# Patient Record
Sex: FEMALE | Race: BLACK OR AFRICAN AMERICAN
[De-identification: names, ages, dates, MRNs, and addresses within clinical notes are randomized per-mention and may not be internally consistent; named-entity substitution may affect disease eponyms.]

---

## 2017-04-18 ENCOUNTER — HOSPITAL ENCOUNTER (EMERGENCY)
Dept: HOSPITAL 74 - JER | Age: 52
Discharge: HOME | End: 2017-04-18
Payer: COMMERCIAL

## 2017-04-18 VITALS — BODY MASS INDEX: 36 KG/M2

## 2017-04-18 VITALS — HEART RATE: 87 BPM | DIASTOLIC BLOOD PRESSURE: 74 MMHG | SYSTOLIC BLOOD PRESSURE: 139 MMHG

## 2017-04-18 VITALS — TEMPERATURE: 98 F

## 2017-04-18 DIAGNOSIS — I10: ICD-10-CM

## 2017-04-18 DIAGNOSIS — T78.40XA: Primary | ICD-10-CM

## 2017-04-18 NOTE — PDOC
History of Present Illness





- General


Chief Complaint: Allergic Reaction


Stated Complaint: ALLERGIC REACTION


Time Seen by Provider: 04/18/17 20:27





- History of Present Illness


Initial Comments: 


04/18/17 20:41


CHIEF COMPLAINT: possible allergic reaction





HISTORY OF PRESENT ILLNESS: 51 yo F with hx of GERD, lupus, HTN presents to ED 

with possible allergic reaction.  Patient states she saw a dermatologist 2 

weeks ago and was prescribed clindamycin and minocycline for "some bumps that 

were like acne" on her face, and yesterday she started feeling "some bumps on 

my tongue, and now on my lips."  She denies any shortness of breath, swelling 

to tongue, throat, or mouth, and denies any itching or rash on her body.  She


denies any fever, chills. vomiting, diarrhea. 





No recent travel or sick contacts. 





PAST MEDICAL HISTORY: Denies past medical history





FAMILY HISTORY: Denies





SOCIAL HISTORY: Former smoker, quit "decades ago."  





SURGICAL HISTORY: Denies





ALLERGIES: No known drug allergies





REVIEW OF SYSTEMS


General/Constitutional: Denies fever or chills. Denies weakness, weight change.





HEENT: "I have these tiny little bumps on the inside of my lips." Denies change 

in vision. Denies ear pain or discharge. Denies sore throat.





Cardiovascular: Denies chest pain or shortness of breath.





Respiratory: Denies cough, wheezing, or hemoptysis.





Gastrointestinal: Denies nausea, vomiting, diarrhea or constipation. Denies 

rectal bleeding.





Genitourinary: Denies dysuria, frequency, or change in urination.





Musculoskeletal: Denies joint or muscle swelling or pain. Denies neck or back 

pain.





Skin and breasts: Denies rash or easy bruising.








PHYSICAL EXAM


General Appearance: Well-appearing, appropriately dressed.  No apparent distress

, no intoxication.





HEENT: No lesions to lips or tongue or anywhere on mucus membranes. No swelling 

to lips, mouth, tongue, uvula.  No erythema to orpharynx. EOMI, PERRLA, normal 

ENT inspection, normal voice, TMs normal, pharynx normal.  No conjunctival 

pallor.  No photophobia, scleral icterus.





Neck: Supple.  Trachea midline. No tenderness, rigidity, carotid bruit, stridor

, lymphadenopathy, or thyromegaly. 





Respiratory/Chest: Lungs CTAB.  





Cardiovascular: RRR. S1, S2. 





Musculoskeletal/Extremities:  Normal inspection. FROM of all extremities, 

normal capillary refill.  Pelvis Stable.  No CVA tenderness. No tenderness to 

extremities, pedal edema, swelling, erythema or deformity.





Integumentary: Appropriate color, dry, warm.  No cyanosis, erythema, jaundice 

or rash





Neurologic: CNs II-XII intact. Fully oriented, alert.  Appropriate mood/affect. 

Motor strength 5/5.  No appreciable EOM palsy, facial droop or sensory deficit.





04/18/17 21:00








Past History





- Past Medical History


Allergies/Adverse Reactions: 


 Allergies











Allergy/AdvReac Type Severity Reaction Status Date / Time


 


amoxicillin [Amoxicillin] Allergy   Verified 04/18/17 20:15


 


amoxicillin trihydrate Allergy   Verified 04/18/17 20:15





[From Omeclamox-Gus]     


 


clarithromycin Allergy   Verified 04/18/17 20:15





[From Omeclamox-Gus]     


 


codeine [Codeine] Allergy  Vomiting Verified 04/18/17 20:15


 


omeprazole Allergy   Verified 04/18/17 20:15





[From Omeclamox-Gus]     


 


omeclamox-gus Allergy  sob, Uncoded 04/18/17 20:15





   nasuea,Vomiting  











Home Medications: 


Ambulatory Orders





Aspirin [ASA -] 81 mg PO DAILY 03/17/13 


Losartan Potassium 25 mg PO DAILY 03/17/13 


Cyclobenzaprine HCl [Flexeril -] 5 mg PO TID PRN #12 tablet 12/03/16 


Nifedipine [Nifedipine ER] 90 mg PO DAILY 12/03/16 


Diphenhydramine HCl [Benadryl -] 25 mg PO Q6H #28 capsule 04/18/17 








Cardiac Disorders: Yes (palpitations)


HTN: Yes





- Psycho/Social/Smoking Cessation Hx


Anxiety: No


Suicidal Ideation: No


Smoking Status: No


Smoking History: Never smoked


Number of Cigarettes Smoked Daily: 0


Hx Alcohol Use: No


Drug/Substance Use Hx: No


Substance Use Type: None


Hx Substance Use Treatment: No





*Physical Exam





- Vital Signs


 Last Vital Signs











Temp Pulse Resp BP Pulse Ox


 


 98.0 F   101 H  20   152/84   99 


 


 04/18/17 20:15  04/18/17 20:15  04/18/17 20:15  04/18/17 20:15  04/18/17 20:15














Medical Decision Making





- Medical Decision Making





04/18/17 21:03


51 yo F with hx of GERD, HTN, lupus presents to ED with possible allergic 

reaction. 





Exam unremarkable, no lesions seen to mouth or mucus membranes. 





-25 mg Benadryl 





Advised patient to discontinue medications and follow up with dermatologist for 

further evaluation of perceived acne/bumps on face.  Advised patient of signs 

and symptoms for return to ER; patient verbalized understanding and agrees to 

plan. 





*DC/Admit/Observation/Transfer


Diagnosis at time of Disposition: 


Allergic reaction


Qualifiers:


 Encounter type: initial encounter Qualified Code(s): T78.40XA - Allergy, 

unspecified, initial encounter





- Discharge Dispostion


Disposition: HOME


Condition at time of disposition: Stable


Admit: No





- Prescriptions


Prescriptions: 


Diphenhydramine HCl [Benadryl -] 25 mg PO Q6H #28 capsule





- Referrals


Referrals: 


Abram Armstrong MD [Primary Care Provider] - 





- Patient Instructions


Additional Instructions: 


Please discontinue the clindamycin and minocyline and follow up with your 

dermatologist for further evaluation and other treatment options.  If you 

experience shortness of breath, swelling to your lips, mouth, tongue, throat, 

or develop any difficulty breathing, or develop a fever or rash to the rest of 

your body, please return to the ER.

## 2018-01-14 ENCOUNTER — HOSPITAL ENCOUNTER (EMERGENCY)
Dept: HOSPITAL 74 - JER | Age: 53
Discharge: HOME | End: 2018-01-14
Payer: COMMERCIAL

## 2018-01-14 VITALS — HEART RATE: 89 BPM | DIASTOLIC BLOOD PRESSURE: 81 MMHG | TEMPERATURE: 98.2 F | SYSTOLIC BLOOD PRESSURE: 153 MMHG

## 2018-01-14 VITALS — BODY MASS INDEX: 35.2 KG/M2

## 2018-01-14 DIAGNOSIS — R20.8: Primary | ICD-10-CM
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
## 2018-01-14 DIAGNOSIS — T49.8X5A: ICD-10-CM

## 2018-01-14 NOTE — PDOC
History of Present Illness





- General


Chief Complaint: Pain


Stated Complaint: RASH


Time Seen by Provider: 01/14/18 08:10


History Source: Patient


Exam Limitations: No Limitations





- History of Present Illness


Initial Comments: 





01/14/18 08:25


Patient is a 52-year-old female with history of lupus and hypertension presents 

for evaluation of "burning to her skin". Patient was a physical per. May 

applied a Biofreeze type ointment to her skin since with burning. Patient was 

nervous to wash area with soap so has only been applying water and Vaseline.  

Still with burning sensation. There is no erythema, no edema, no rash. Me 

"burning sensation"





Allergies: No known allergies





Medications: See medication list[]





Family History: Non-contributory





Social History: Denies smoking, alcohol use, or IVDU





Vital signs on arrival are [notable for pulse of 96.]





Review of Systems


GENERAL/CONSTITUTIONAL: [No fever or chills. No weakness. No weight change.]


HEAD, EYES, EARS, NOSE AND THROAT: [No change in vision. No ear pain or 

discharge. No sore throat. ]


CARDIOVASCULAR: [No chest pain or shortness of breath.]


RESPIRATORY: [No cough, wheezing, or hemoptysis.]


GASTROINTESTINAL: [No nausea, vomiting, diarrhea or constipation. No rectal 

bleeding.]


GENITOURINARY: [No dysuria, frequency, or change in urination.]


MUSCULOSKELETAL: [No joint or muscle swelling or pain. No neck or back pain.]


SKIN: [No rash or easy bruising. Burning sensation to the skin. ]


NEUROLOGIC: [No headache, vertigo, loss of consciousness, or loss of sensation.]


PSYCHIATRIC: [No depression or anxiety.]


ENDOCRINE: [No increased thirst. No abnormal weight change.]


HEMATOLOGIC/LYMPHATIC: [No anemia, easy bleeding, or history of blood clots.]


ALLERGIC/IMMUNOLOGIC: [No hives or skin allergy. No latex allergy.]





Physical Exam: 


GENERAL: [The patient is awake, alert, and fully oriented, in no acute distress.

]


EYES: [Pupils equal, round and reactive to light, extraocular movements intact, 

sclera anicteric, conjunctiva clear.]


ENT: [Ears normal, nares patent, oropharynx clear without exudates.  Moist 

mucous membranes. No uvula deviation]


NECK: [Normal range of motion, supple without lymphadenopathy, JVD, or masses.]


LUNGS: [Breath sounds equal, clear to auscultation bilaterally.  No wheezes, 

and no crackles.]


HEART: [Regular rate and rhythm, normal S1 and S2 without murmur, rub or gallop.

]


ABDOMEN: [Soft, nontender, normoactive bowel sounds.  No guarding, no rebound.  

No masses. No bruising or abrasions]


MUSCULOSKELETAL: [Normal range of motion, no edema.  No clubbing or cyanosis. 

No cords, erythema, or tenderness. No CVA Tenderness with fist.]


NEUROLOGICAL: [Cranial nerves II through XII grossly intact.  Normal speech, 

normal gait.]


SKIN: [Warm, Dry, normal turgor, no rashes or lesions noted. No erythema, edema

, or rash noted. ]











Past History





- Past Medical History


Allergies/Adverse Reactions: 


 Allergies











Allergy/AdvReac Type Severity Reaction Status Date / Time


 


amoxicillin [Amoxicillin] Allergy   Verified 01/14/18 07:30


 


amoxicillin trihydrate Allergy   Verified 01/14/18 07:30





[From Omeclamox-Gus]     


 


clarithromycin Allergy   Verified 01/14/18 07:30





[From Omeclamox-Gus]     


 


codeine [Codeine] Allergy  Vomiting Verified 01/14/18 07:30


 


omeprazole Allergy   Verified 01/14/18 07:30





[From Omeclamox-Gus]     


 


omeclamox-gus Allergy  sob, Uncoded 01/14/18 07:30





   nasuea,Vomiting  











Home Medications: 


Ambulatory Orders





Aspirin [ASA -] 81 mg PO DAILY 03/17/13 


Losartan Potassium 25 mg PO DAILY 03/17/13 


Cyclobenzaprine HCl [Flexeril -] 5 mg PO TID PRN #12 tablet 12/03/16 


Nifedipine [Nifedipine ER] 90 mg PO DAILY 12/03/16 


Diphenhydramine HCl [Benadryl -] 25 mg PO Q6H #28 capsule 04/18/17 








Cardiac Disorders: Yes (palpitations)


COPD: No


HTN: Yes


Other medical history: BULDGING DISK CERVICAL, LUMBAR





- Suicide/Smoking/Psychosocial Hx


Smoking Status: No


Smoking History: Never smoked


Number of Cigarettes Smoked Daily: 0


Hx Alcohol Use: Yes (SOCIAL)


Drug/Substance Use Hx: No


Substance Use Type: None


Hx Substance Use Treatment: No





*Physical Exam





- Vital Signs


 Last Vital Signs











Temp Pulse Resp BP Pulse Ox


 


 98.2 F   89   20   153/81   100 


 


 01/14/18 07:26  01/14/18 07:26  01/14/18 07:26  01/14/18 07:26  01/14/18 07:26














Medical Decision Making





- Medical Decision Making





01/14/18 08:30


A/P: Patient with sensation to skin after applying Biofreeze has not washed 

area with soap because she was scared to apply soap to area Biofreeze is known 

to cause a warm tingling sensation to the skin. Patient did apply Vaseline 

which may have locked the sensation into the skin. I've advised patients washed 

area with soap thoroughly, no new soap only soap that she is use in the past 

because her skin is sensitive. There is no erythema, edema, no rash, no 

evidence of reaction. After washing of sensation still remains to follow-up 

with dermatology.  She denies any chest pain or shortness of breath, no other 

complaint.





*DC/Admit/Observation/Transfer


Diagnosis at time of Disposition: 


 Skin irritation due to topical agent








- Discharge Dispostion


Disposition: HOME


Condition at time of disposition: Good


Admit: No





- Referrals


Referrals: 


Lida Juárez MD [Primary Care Provider] - 





- Patient Instructions


Additional Instructions: 


Wash area thoroughly with soap.


If burning persists follow up with dermatology.  





- Post Discharge Activity


Forms/Work/School Notes:  Back to Work

## 2018-05-07 ENCOUNTER — HOSPITAL ENCOUNTER (EMERGENCY)
Dept: HOSPITAL 74 - JERFT | Age: 53
Discharge: HOME | End: 2018-05-07
Payer: COMMERCIAL

## 2018-05-07 VITALS — HEART RATE: 95 BPM | TEMPERATURE: 98.1 F | SYSTOLIC BLOOD PRESSURE: 128 MMHG | DIASTOLIC BLOOD PRESSURE: 71 MMHG

## 2018-05-07 VITALS — BODY MASS INDEX: 34.4 KG/M2

## 2018-05-07 DIAGNOSIS — M17.0: Primary | ICD-10-CM

## 2018-05-07 DIAGNOSIS — I10: ICD-10-CM

## 2018-05-07 PROCEDURE — 3E0233Z INTRODUCTION OF ANTI-INFLAMMATORY INTO MUSCLE, PERCUTANEOUS APPROACH: ICD-10-PCS

## 2018-05-07 NOTE — PDOC
Rapid Medical Evaluation


Time Seen by Provider: 05/07/18 16:04


Medical Evaluation: 


 Allergies











Allergy/AdvReac Type Severity Reaction Status Date / Time


 


amoxicillin [Amoxicillin] Allergy   Verified 05/07/18 16:04


 


amoxicillin trihydrate Allergy   Verified 05/07/18 16:04





[From Omeclamox-Gus]     


 


clarithromycin Allergy   Verified 05/07/18 16:04





[From Omeclamox-Gus]     


 


codeine [Codeine] Allergy  Vomiting Verified 05/07/18 16:04


 


omeprazole Allergy   Verified 05/07/18 16:04





[From Omeclamox-Gus]     


 


omeclamox-gus Allergy  sob, Uncoded 05/07/18 16:04





   nasuea,Vomiting  








I have performed a brief in-person evaluation of this patient. 


The patient presents with a chief complaint of:  b/l knee pain since yesterday.

  No trauma.


Pertinent physical exam findings:  none.  patient is ambulatory.  Has had 

multiple knee surgeries on both knees.


I have ordered the following:  nothing


The patient will proceed to the ED for further evaluation.

## 2018-07-03 ENCOUNTER — TRANSCRIBE ORDERS (OUTPATIENT)
Dept: ADMINISTRATIVE | Facility: HOSPITAL | Age: 53
End: 2018-07-03

## 2018-07-09 ENCOUNTER — TRANSCRIBE ORDERS (OUTPATIENT)
Dept: ADMINISTRATIVE | Facility: HOSPITAL | Age: 53
End: 2018-07-09

## 2018-07-09 DIAGNOSIS — N64.4 PAIN OF LEFT BREAST: Primary | ICD-10-CM

## 2018-07-16 ENCOUNTER — APPOINTMENT (OUTPATIENT)
Dept: OTHER | Facility: HOSPITAL | Age: 53
End: 2018-07-16

## 2018-07-16 ENCOUNTER — HOSPITAL ENCOUNTER (OUTPATIENT)
Dept: ULTRASOUND IMAGING | Facility: HOSPITAL | Age: 53
Discharge: HOME | End: 2018-07-16

## 2018-07-16 ENCOUNTER — HOSPITAL ENCOUNTER (OUTPATIENT)
Dept: MAMMOGRAPHY | Facility: HOSPITAL | Age: 53
Discharge: HOME | End: 2018-07-16
Admitting: NURSE PRACTITIONER

## 2018-07-16 DIAGNOSIS — N64.4 PAIN OF LEFT BREAST: ICD-10-CM

## 2018-07-16 PROCEDURE — 77066 DX MAMMO INCL CAD BI: CPT | Performed by: RADIOLOGY

## 2018-07-16 PROCEDURE — 76642 ULTRASOUND BREAST LIMITED: CPT | Performed by: RADIOLOGY

## 2018-07-16 PROCEDURE — G0279 TOMOSYNTHESIS, MAMMO: HCPCS

## 2018-07-16 PROCEDURE — 77066 DX MAMMO INCL CAD BI: CPT

## 2018-07-16 PROCEDURE — 76642 ULTRASOUND BREAST LIMITED: CPT

## 2018-07-16 PROCEDURE — 77062 BREAST TOMOSYNTHESIS BI: CPT | Performed by: RADIOLOGY

## 2018-08-27 ENCOUNTER — CONSULT (OUTPATIENT)
Dept: CARDIOLOGY | Facility: CLINIC | Age: 53
End: 2018-08-27

## 2018-08-27 VITALS
WEIGHT: 155 LBS | HEART RATE: 75 BPM | BODY MASS INDEX: 24.33 KG/M2 | DIASTOLIC BLOOD PRESSURE: 82 MMHG | SYSTOLIC BLOOD PRESSURE: 122 MMHG | HEIGHT: 67 IN

## 2018-08-27 DIAGNOSIS — I20.9 ANGINA, CLASS III (HCC): ICD-10-CM

## 2018-08-27 DIAGNOSIS — I48.0 PAROXYSMAL ATRIAL FIBRILLATION (HCC): Primary | ICD-10-CM

## 2018-08-27 PROBLEM — K21.9 GERD (GASTROESOPHAGEAL REFLUX DISEASE): Status: ACTIVE | Noted: 2018-08-27

## 2018-08-27 PROCEDURE — 99243 OFF/OP CNSLTJ NEW/EST LOW 30: CPT | Performed by: INTERNAL MEDICINE

## 2018-08-27 PROCEDURE — 93000 ELECTROCARDIOGRAM COMPLETE: CPT | Performed by: INTERNAL MEDICINE

## 2018-09-20 ENCOUNTER — APPOINTMENT (OUTPATIENT)
Dept: CARDIOLOGY | Facility: HOSPITAL | Age: 53
End: 2018-09-20

## 2018-11-09 ENCOUNTER — HOSPITAL ENCOUNTER (OUTPATIENT)
Dept: CARDIOLOGY | Facility: HOSPITAL | Age: 53
Discharge: HOME | End: 2018-11-09
Admitting: PHYSICIAN ASSISTANT

## 2018-11-09 VITALS — HEIGHT: 67 IN | BODY MASS INDEX: 24.33 KG/M2 | WEIGHT: 155 LBS

## 2018-11-09 DIAGNOSIS — I20.9 ANGINA, CLASS III (HCC): ICD-10-CM

## 2018-11-09 LAB
BH CV ECHO MEAS - AO MAX PG (FULL): 4.9 MMHG
BH CV ECHO MEAS - AO MAX PG: 12 MMHG
BH CV ECHO MEAS - AO MEAN PG (FULL): 1.9 MMHG
BH CV ECHO MEAS - AO MEAN PG: 5.3 MMHG
BH CV ECHO MEAS - AO V2 MAX: 169.4 CM/SEC
BH CV ECHO MEAS - AO V2 MEAN: 105.1 CM/SEC
BH CV ECHO MEAS - AO V2 VTI: 33 CM
BH CV ECHO MEAS - BSA(HAYCOCK): 1.8 M^2
BH CV ECHO MEAS - BSA: 1.8 M^2
BH CV ECHO MEAS - BZI_BMI: 24.3 KILOGRAMS/M^2
BH CV ECHO MEAS - BZI_METRIC_HEIGHT: 170.2 CM
BH CV ECHO MEAS - BZI_METRIC_WEIGHT: 70.3 KG
BH CV ECHO MEAS - LV MAX PG: 7.1 MMHG
BH CV ECHO MEAS - LV MEAN PG: 3.4 MMHG
BH CV ECHO MEAS - LV V1 MAX: 133.3 CM/SEC
BH CV ECHO MEAS - LV V1 MEAN: 84.2 CM/SEC
BH CV ECHO MEAS - LV V1 VTI: 26.9 CM
BH CV ECHO MEAS - RAP SYSTOLE: 8 MMHG
BH CV ECHO MEAS - RVSP: 26 MMHG
BH CV ECHO MEAS - TR MAX PG: 18 MMHG
BH CV ECHO MEAS - TR MAX VEL: 210.7 CM/SEC
BH CV STRESS BP STAGE 1: NORMAL
BH CV STRESS BP STAGE 2: NORMAL
BH CV STRESS DURATION MIN STAGE 1: 3
BH CV STRESS DURATION MIN STAGE 2: 1
BH CV STRESS DURATION SEC STAGE 1: 0
BH CV STRESS DURATION SEC STAGE 2: 58
BH CV STRESS GRADE STAGE 1: 10
BH CV STRESS GRADE STAGE 2: 12
BH CV STRESS HR STAGE 1: 130
BH CV STRESS HR STAGE 2: 157
BH CV STRESS METS STAGE 1: 5
BH CV STRESS METS STAGE 2: 7.5
BH CV STRESS PROTOCOL 1: NORMAL
BH CV STRESS RECOVERY BP: NORMAL MMHG
BH CV STRESS RECOVERY HR: 98 BPM
BH CV STRESS SPEED STAGE 1: 1.7
BH CV STRESS SPEED STAGE 2: 2.5
BH CV STRESS STAGE 1: 1
BH CV STRESS STAGE 2: 2
BH CV VAS BP LEFT ARM: NORMAL MMHG
LV EF 2D ECHO EST: 55 %
MAXIMAL PREDICTED HEART RATE: 166 BPM
PERCENT MAX PREDICTED HR: 94.58 %
STRESS BASELINE BP: NORMAL MMHG
STRESS BASELINE HR: 86 BPM
STRESS PERCENT HR: 111 %
STRESS POST ESTIMATED WORKLOAD: 7 METS
STRESS POST EXERCISE DUR MIN: 4 MIN
STRESS POST EXERCISE DUR SEC: 58 SEC
STRESS POST PEAK BP: NORMAL MMHG
STRESS POST PEAK HR: 157 BPM
STRESS TARGET HR: 141 BPM

## 2018-11-09 PROCEDURE — 93017 CV STRESS TEST TRACING ONLY: CPT

## 2018-11-09 PROCEDURE — 93325 DOPPLER ECHO COLOR FLOW MAPG: CPT

## 2018-11-09 PROCEDURE — 93350 STRESS TTE ONLY: CPT

## 2018-11-09 PROCEDURE — 93018 CV STRESS TEST I&R ONLY: CPT | Performed by: INTERNAL MEDICINE

## 2018-11-09 PROCEDURE — 93350 STRESS TTE ONLY: CPT | Performed by: INTERNAL MEDICINE

## 2018-11-09 PROCEDURE — 93320 DOPPLER ECHO COMPLETE: CPT

## 2018-12-03 ENCOUNTER — HOSPITAL ENCOUNTER (OUTPATIENT)
Dept: GENERAL RADIOLOGY | Facility: HOSPITAL | Age: 53
Discharge: HOME | End: 2018-12-03
Admitting: NURSE PRACTITIONER

## 2018-12-03 ENCOUNTER — TRANSCRIBE ORDERS (OUTPATIENT)
Dept: ADMINISTRATIVE | Facility: HOSPITAL | Age: 53
End: 2018-12-03

## 2018-12-03 DIAGNOSIS — M25.512 PAIN, JOINT, SHOULDER, LEFT: Primary | ICD-10-CM

## 2018-12-03 DIAGNOSIS — G89.29 CHRONIC LOW BACK PAIN, UNSPECIFIED BACK PAIN LATERALITY, WITH SCIATICA PRESENCE UNSPECIFIED: ICD-10-CM

## 2018-12-03 DIAGNOSIS — M54.5 CHRONIC LOW BACK PAIN, UNSPECIFIED BACK PAIN LATERALITY, WITH SCIATICA PRESENCE UNSPECIFIED: ICD-10-CM

## 2018-12-03 DIAGNOSIS — R10.9 ABDOMINAL PAIN IN FEMALE: ICD-10-CM

## 2018-12-03 PROCEDURE — 73030 X-RAY EXAM OF SHOULDER: CPT

## 2018-12-03 PROCEDURE — 72114 X-RAY EXAM L-S SPINE BENDING: CPT

## 2018-12-03 PROCEDURE — 74019 RADEX ABDOMEN 2 VIEWS: CPT

## 2018-12-04 ENCOUNTER — TRANSCRIBE ORDERS (OUTPATIENT)
Dept: ADMINISTRATIVE | Facility: HOSPITAL | Age: 53
End: 2018-12-04

## 2018-12-04 DIAGNOSIS — E04.9 ENLARGED THYROID: Primary | ICD-10-CM

## 2018-12-10 ENCOUNTER — HOSPITAL ENCOUNTER (OUTPATIENT)
Dept: ULTRASOUND IMAGING | Facility: HOSPITAL | Age: 53
Discharge: HOME | End: 2018-12-10
Admitting: NURSE PRACTITIONER

## 2018-12-10 DIAGNOSIS — E04.9 ENLARGED THYROID: ICD-10-CM

## 2018-12-10 PROCEDURE — 76536 US EXAM OF HEAD AND NECK: CPT

## 2019-03-24 ENCOUNTER — HOSPITAL ENCOUNTER (EMERGENCY)
Dept: HOSPITAL 74 - JERFT | Age: 54
Discharge: HOME | End: 2019-03-24
Payer: COMMERCIAL

## 2019-03-24 VITALS — DIASTOLIC BLOOD PRESSURE: 71 MMHG | TEMPERATURE: 98 F | SYSTOLIC BLOOD PRESSURE: 158 MMHG | HEART RATE: 78 BPM

## 2019-03-24 VITALS — BODY MASS INDEX: 35.9 KG/M2
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
## 2019-03-24 DIAGNOSIS — Z87.39: ICD-10-CM

## 2019-03-24 DIAGNOSIS — E78.00: ICD-10-CM
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
## 2019-03-24 DIAGNOSIS — S80.02XA: Primary | ICD-10-CM

## 2019-03-24 DIAGNOSIS — I10: ICD-10-CM
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
## 2019-03-24 DIAGNOSIS — S80.01XA: ICD-10-CM

## 2019-03-24 NOTE — PDOC
History of Present Illness





- General


Chief Complaint: Pain


Stated Complaint: PAIN IN THE KNEES


Time Seen by Provider: 03/24/19 20:02





- History of Present Illness


Initial Comments: 





03/24/19 20:05


54-year-old female with a past medical history significant for lupus and 

hypertension presents for evaluation of bilateral knee pain after a fall. She 

states she was coming out of a supermarket and tripped over a rock. She did not 

hit her head. She complains of bilateral knee pain. Pointing to the anterior 

aspect of both knees.





Past History





- Past Medical History


Allergies/Adverse Reactions: 


 Allergies











Allergy/AdvReac Type Severity Reaction Status Date / Time


 


amoxicillin [Amoxicillin] Allergy   Verified 03/24/19 19:53


 


amoxicillin trihydrate Allergy   Verified 03/24/19 19:53





[From Omeclamox-Gus]     


 


clarithromycin Allergy   Verified 03/24/19 19:53





[From Omeclamox-Gus]     


 


codeine [Codeine] Allergy  Vomiting Verified 03/24/19 19:53


 


omeprazole Allergy   Verified 03/24/19 19:53





[From Omeclamox-Gus]     


 


omeclamox-gus Allergy  sob, Uncoded 03/24/19 19:53





   nasuea,Vomiting  











Home Medications: 


Ambulatory Orders





Aspirin [ASA -] 81 mg PO DAILY 03/17/13 


Atorvastatin Ca [Lipitor] 1 tab PO DAILY 08/27/18 


Hydrochlorothiazide 1 tab PO DAILY 08/27/18 


Metoprolol Succinate 1 tab PO HS 08/27/18 


Naproxen [Naprosyn] 1 tab PO DAILY PRN 08/27/18 


Nifedipine [Nifedipine ER] 1 tab PO DAILY 08/27/18 








Cardiac Disorders: Yes (palpitations)


COPD: No


HTN: Yes


Hypercholesterolemia: Yes





- Surgical History


Orthopedic Surgery: Yes (left knee)





- Suicide/Smoking/Psychosocial Hx


Smoking Status: No


Smoking History: Never smoked


Have you smoked in the past 12 months: No


Number of Cigarettes Smoked Daily: 0


Information on smoking cessation initiated: No


Hx Alcohol Use: No


Drug/Substance Use Hx: No


Substance Use Type: None


Hx Substance Use Treatment: No





**Review of Systems





- Review of Systems


Musculoskeletal: Yes: Joint Pain





*Physical Exam





- Vital Signs


 Last Vital Signs











Temp Pulse Resp BP Pulse Ox


 


 98.0 F   78   16   158/71   100 


 


 03/24/19 19:51  03/24/19 19:51  03/24/19 19:51  03/24/19 19:51  03/24/19 19:51














- Physical Exam


Comments: 





03/24/19 20:06


Normal gait. She bears weight. Bilateral knee skin color and temperature are 

normal. There are no abrasions about the knee or lacerations. Range of motion 0-

100 without discomfort. Mild diffuse tenderness about the anterior aspect of 

the knee. No joint line tenderness no tenderness over the medial or lateral 

condyles of the femurs no tenderness over the tibial plateau. No tenderness 

about the patella. No evidence of instability bilaterally. No evidence of gross 

sensorimotor deficits. Thighs and calves are soft and nontender. Both knees 

were examined





Moderate Sedation





- Procedure Monitoring


Vital Signs: 


Procedure Monitoring Vital Signs











Temperature  98.0 F   03/24/19 19:51


 


Pulse Rate  78   03/24/19 19:51


 


Respiratory Rate  16   03/24/19 19:51


 


Blood Pressure  158/71   03/24/19 19:51


 


O2 Sat by Pulse Oximetry (%)  100   03/24/19 19:51











Medical Decision Making





- Medical Decision Making





03/24/19 20:07


Bilateral knee contusion. She does have a history of osteoarthritis in the 

right knee for which she received hyaluronic acid injections a cortisone shot. 

She does have mild lightening of the skin about the anterior lateral aspect of 

the right knee.





*DC/Admit/Observation/Transfer


Diagnosis at time of Disposition: 


 Knee contusion








- Discharge Dispostion


Disposition: HOME


Condition at time of disposition: Stable


Decision to Admit order: No





- Referrals


Referrals: 


Lida Juárez MD [Primary Care Provider] - 


Forrest Lopez DO [Staff Physician] - 





- Patient Instructions


Printed Discharge Instructions:  Contusion


Additional Instructions: 


Return to the emergency room for worsening symptoms. Because of your high blood 

pressure medication please only take Tylenol for pain. He may take Tylenol as 

directed. Return to the emergency room for worsening symptoms and follow-up 

with orthopedic surgery in 1-2 days for further evaluation and treatment 

options.





- Post Discharge Activity

## 2019-09-03 ENCOUNTER — TRANSCRIBE ORDERS (OUTPATIENT)
Dept: ADMINISTRATIVE | Facility: HOSPITAL | Age: 54
End: 2019-09-03

## 2019-09-03 DIAGNOSIS — Z12.31 VISIT FOR SCREENING MAMMOGRAM: Primary | ICD-10-CM

## 2019-11-11 ENCOUNTER — HOSPITAL ENCOUNTER (OUTPATIENT)
Dept: MAMMOGRAPHY | Facility: HOSPITAL | Age: 54
Discharge: HOME | End: 2019-11-11
Admitting: NURSE PRACTITIONER

## 2019-11-11 DIAGNOSIS — Z12.31 VISIT FOR SCREENING MAMMOGRAM: ICD-10-CM

## 2019-11-11 PROCEDURE — 77067 SCR MAMMO BI INCL CAD: CPT | Performed by: RADIOLOGY

## 2019-11-11 PROCEDURE — 77067 SCR MAMMO BI INCL CAD: CPT

## 2019-11-11 PROCEDURE — 77063 BREAST TOMOSYNTHESIS BI: CPT | Performed by: RADIOLOGY

## 2019-11-11 PROCEDURE — 77063 BREAST TOMOSYNTHESIS BI: CPT

## 2020-01-21 ENCOUNTER — HOSPITAL ENCOUNTER (EMERGENCY)
Dept: HOSPITAL 74 - JERFT | Age: 55
Discharge: HOME | End: 2020-01-21
Payer: COMMERCIAL

## 2020-01-21 VITALS — HEART RATE: 90 BPM | DIASTOLIC BLOOD PRESSURE: 92 MMHG | TEMPERATURE: 98.1 F | SYSTOLIC BLOOD PRESSURE: 158 MMHG

## 2020-01-21 VITALS — BODY MASS INDEX: 35.9 KG/M2

## 2020-01-21 DIAGNOSIS — Z88.5: ICD-10-CM

## 2020-01-21 DIAGNOSIS — M54.5: Primary | ICD-10-CM

## 2020-01-21 DIAGNOSIS — M25.561: ICD-10-CM

## 2020-01-21 DIAGNOSIS — Z88.8: ICD-10-CM
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
## 2020-01-21 DIAGNOSIS — Z88.0: ICD-10-CM

## 2020-01-21 DIAGNOSIS — M79.671: ICD-10-CM
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
## 2020-01-21 LAB
APPEARANCE UR: CLEAR
BILIRUB UR STRIP.AUTO-MCNC: NEGATIVE MG/DL
COLOR UR: YELLOW
KETONES UR QL STRIP: NEGATIVE
LEUKOCYTE ESTERASE UR QL STRIP.AUTO: NEGATIVE
NITRITE UR QL STRIP: NEGATIVE
PH UR: 6.5 [PH] (ref 5–8)
PROT UR QL STRIP: NEGATIVE
PROT UR QL STRIP: NEGATIVE
SP GR UR: 1.01 (ref 1.01–1.03)
UROBILINOGEN UR STRIP-MCNC: 0.2 MG/DL (ref 0.2–1)

## 2020-01-21 PROCEDURE — 3E0233Z INTRODUCTION OF ANTI-INFLAMMATORY INTO MUSCLE, PERCUTANEOUS APPROACH: ICD-10-PCS
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
## 2020-09-15 ENCOUNTER — TRANSCRIBE ORDERS (OUTPATIENT)
Dept: ADMINISTRATIVE | Facility: HOSPITAL | Age: 55
End: 2020-09-15

## 2020-09-15 DIAGNOSIS — M51.36 DEGENERATIVE DISC DISEASE, LUMBAR: ICD-10-CM

## 2020-09-15 DIAGNOSIS — R10.11 RUQ ABDOMINAL PAIN: Primary | ICD-10-CM

## 2020-10-12 ENCOUNTER — HOSPITAL ENCOUNTER (OUTPATIENT)
Dept: ULTRASOUND IMAGING | Facility: HOSPITAL | Age: 55
Discharge: HOME | End: 2020-10-12
Admitting: NURSE PRACTITIONER

## 2020-10-12 DIAGNOSIS — R10.11 RUQ ABDOMINAL PAIN: ICD-10-CM

## 2020-10-12 PROCEDURE — 76705 ECHO EXAM OF ABDOMEN: CPT

## 2021-10-21 ENCOUNTER — TRANSCRIBE ORDERS (OUTPATIENT)
Dept: ADMINISTRATIVE | Facility: HOSPITAL | Age: 56
End: 2021-10-21

## 2021-10-21 DIAGNOSIS — Z12.31 VISIT FOR SCREENING MAMMOGRAM: Primary | ICD-10-CM

## 2021-11-08 ENCOUNTER — HOSPITAL ENCOUNTER (OUTPATIENT)
Dept: MAMMOGRAPHY | Facility: HOSPITAL | Age: 56
Discharge: HOME | End: 2021-11-08
Admitting: NURSE PRACTITIONER

## 2021-11-08 DIAGNOSIS — Z12.31 VISIT FOR SCREENING MAMMOGRAM: ICD-10-CM

## 2021-11-08 PROCEDURE — 77067 SCR MAMMO BI INCL CAD: CPT | Performed by: RADIOLOGY

## 2021-11-08 PROCEDURE — 77063 BREAST TOMOSYNTHESIS BI: CPT | Performed by: RADIOLOGY

## 2021-11-08 PROCEDURE — 77063 BREAST TOMOSYNTHESIS BI: CPT

## 2021-11-08 PROCEDURE — 77067 SCR MAMMO BI INCL CAD: CPT

## 2021-11-15 ENCOUNTER — HOSPITAL ENCOUNTER (OUTPATIENT)
Dept: MAMMOGRAPHY | Facility: HOSPITAL | Age: 56
Discharge: HOME | End: 2021-11-15
Admitting: RADIOLOGY

## 2021-11-15 DIAGNOSIS — R92.8 ABNORMAL MAMMOGRAM: ICD-10-CM

## 2021-11-15 PROCEDURE — 77061 BREAST TOMOSYNTHESIS UNI: CPT | Performed by: RADIOLOGY

## 2021-11-15 PROCEDURE — 77065 DX MAMMO INCL CAD UNI: CPT | Performed by: RADIOLOGY

## 2021-11-15 PROCEDURE — 77065 DX MAMMO INCL CAD UNI: CPT

## 2021-11-15 PROCEDURE — G0279 TOMOSYNTHESIS, MAMMO: HCPCS

## 2022-05-11 ENCOUNTER — TRANSCRIBE ORDERS (OUTPATIENT)
Dept: ADMINISTRATIVE | Facility: HOSPITAL | Age: 57
End: 2022-05-11

## 2022-05-11 DIAGNOSIS — R51.9 CHRONIC LEFT-SIDED HEADACHES: Primary | ICD-10-CM

## 2022-05-11 DIAGNOSIS — G89.29 CHRONIC LEFT-SIDED HEADACHES: Primary | ICD-10-CM

## 2022-08-17 ENCOUNTER — HOSPITAL ENCOUNTER (OUTPATIENT)
Dept: HOSPITAL 74 - FASU-ENDO | Age: 57
Discharge: HOME | End: 2022-08-17
Attending: INTERNAL MEDICINE
Payer: COMMERCIAL

## 2022-08-17 VITALS — HEART RATE: 80 BPM | DIASTOLIC BLOOD PRESSURE: 72 MMHG | TEMPERATURE: 98 F | SYSTOLIC BLOOD PRESSURE: 138 MMHG

## 2022-08-17 VITALS — BODY MASS INDEX: 37.3 KG/M2

## 2022-08-17 VITALS — RESPIRATION RATE: 18 BRPM

## 2022-08-17 DIAGNOSIS — K64.1: ICD-10-CM

## 2022-08-17 DIAGNOSIS — Z12.11: Primary | ICD-10-CM

## 2022-08-17 DIAGNOSIS — K57.30: ICD-10-CM

## 2022-08-17 DIAGNOSIS — K64.8: ICD-10-CM

## 2022-08-17 PROCEDURE — 0DJD8ZZ INSPECTION OF LOWER INTESTINAL TRACT, VIA NATURAL OR ARTIFICIAL OPENING ENDOSCOPIC: ICD-10-PCS | Performed by: INTERNAL MEDICINE

## 2022-12-20 ENCOUNTER — OFFICE VISIT (OUTPATIENT)
Dept: NEUROLOGY | Facility: CLINIC | Age: 57
End: 2022-12-20

## 2022-12-20 ENCOUNTER — LAB (OUTPATIENT)
Dept: LAB | Facility: HOSPITAL | Age: 57
End: 2022-12-20

## 2022-12-20 VITALS
DIASTOLIC BLOOD PRESSURE: 82 MMHG | SYSTOLIC BLOOD PRESSURE: 126 MMHG | HEIGHT: 67 IN | RESPIRATION RATE: 16 BRPM | TEMPERATURE: 98.2 F | OXYGEN SATURATION: 99 % | HEART RATE: 77 BPM | WEIGHT: 156.8 LBS | BODY MASS INDEX: 24.61 KG/M2

## 2022-12-20 DIAGNOSIS — G37.9 DEMYELINATING DISEASE: ICD-10-CM

## 2022-12-20 DIAGNOSIS — G43.C0 PERIODIC HEADACHE SYNDROME, NOT INTRACTABLE: Primary | ICD-10-CM

## 2022-12-20 LAB
BASOPHILS # BLD AUTO: 0.04 10*3/MM3 (ref 0–0.2)
BASOPHILS NFR BLD AUTO: 0.7 % (ref 0–1.5)
CHROMATIN AB SERPL-ACNC: <10 IU/ML (ref 0–14)
DEPRECATED RDW RBC AUTO: 42.6 FL (ref 37–54)
EOSINOPHIL # BLD AUTO: 0.07 10*3/MM3 (ref 0–0.4)
EOSINOPHIL NFR BLD AUTO: 1.2 % (ref 0.3–6.2)
ERYTHROCYTE [DISTWIDTH] IN BLOOD BY AUTOMATED COUNT: 13.3 % (ref 12.3–15.4)
ERYTHROCYTE [SEDIMENTATION RATE] IN BLOOD: 27 MM/HR (ref 0–30)
HCT VFR BLD AUTO: 40.5 % (ref 34–46.6)
HGB BLD-MCNC: 13.3 G/DL (ref 12–15.9)
IMM GRANULOCYTES # BLD AUTO: 0.01 10*3/MM3 (ref 0–0.05)
IMM GRANULOCYTES NFR BLD AUTO: 0.2 % (ref 0–0.5)
LYMPHOCYTES # BLD AUTO: 1.77 10*3/MM3 (ref 0.7–3.1)
LYMPHOCYTES NFR BLD AUTO: 30 % (ref 19.6–45.3)
MCH RBC QN AUTO: 28.5 PG (ref 26.6–33)
MCHC RBC AUTO-ENTMCNC: 32.8 G/DL (ref 31.5–35.7)
MCV RBC AUTO: 86.9 FL (ref 79–97)
MONOCYTES # BLD AUTO: 0.46 10*3/MM3 (ref 0.1–0.9)
MONOCYTES NFR BLD AUTO: 7.8 % (ref 5–12)
NEUTROPHILS NFR BLD AUTO: 3.55 10*3/MM3 (ref 1.7–7)
NEUTROPHILS NFR BLD AUTO: 60.1 % (ref 42.7–76)
NRBC BLD AUTO-RTO: 0 /100 WBC (ref 0–0.2)
PLATELET # BLD AUTO: 259 10*3/MM3 (ref 140–450)
PMV BLD AUTO: 11.3 FL (ref 6–12)
RBC # BLD AUTO: 4.66 10*6/MM3 (ref 3.77–5.28)
WBC NRBC COR # BLD: 5.9 10*3/MM3 (ref 3.4–10.8)

## 2022-12-20 PROCEDURE — 82784 ASSAY IGA/IGD/IGG/IGM EACH: CPT

## 2022-12-20 PROCEDURE — 85597 PHOSPHOLIPID PLTLT NEUTRALIZ: CPT

## 2022-12-20 PROCEDURE — 86038 ANTINUCLEAR ANTIBODIES: CPT

## 2022-12-20 PROCEDURE — 86231 EMA EACH IG CLASS: CPT

## 2022-12-20 PROCEDURE — 81241 F5 GENE: CPT

## 2022-12-20 PROCEDURE — 36415 COLL VENOUS BLD VENIPUNCTURE: CPT

## 2022-12-20 PROCEDURE — 85730 THROMBOPLASTIN TIME PARTIAL: CPT

## 2022-12-20 PROCEDURE — 86362 MOG-IGG1 ANTB CBA EACH: CPT

## 2022-12-20 PROCEDURE — 85598 HEXAGNAL PHOSPH PLTLT NEUTRL: CPT

## 2022-12-20 PROCEDURE — 85610 PROTHROMBIN TIME: CPT

## 2022-12-20 PROCEDURE — 85652 RBC SED RATE AUTOMATED: CPT

## 2022-12-20 PROCEDURE — 85670 THROMBIN TIME PLASMA: CPT

## 2022-12-20 PROCEDURE — 86431 RHEUMATOID FACTOR QUANT: CPT

## 2022-12-20 PROCEDURE — 86146 BETA-2 GLYCOPROTEIN ANTIBODY: CPT

## 2022-12-20 PROCEDURE — 86051 AQUAPORIN-4 ANTB ELISA: CPT

## 2022-12-20 PROCEDURE — 86147 CARDIOLIPIN ANTIBODY EA IG: CPT

## 2022-12-20 PROCEDURE — 99244 OFF/OP CNSLTJ NEW/EST MOD 40: CPT | Performed by: PSYCHIATRY & NEUROLOGY

## 2022-12-20 PROCEDURE — 85025 COMPLETE CBC W/AUTO DIFF WBC: CPT

## 2022-12-20 PROCEDURE — 82607 VITAMIN B-12: CPT

## 2022-12-20 PROCEDURE — 81240 F2 GENE: CPT

## 2022-12-20 PROCEDURE — 85613 RUSSELL VIPER VENOM DILUTED: CPT

## 2022-12-20 PROCEDURE — 85732 THROMBOPLASTIN TIME PARTIAL: CPT

## 2022-12-20 PROCEDURE — 82164 ANGIOTENSIN I ENZYME TEST: CPT

## 2022-12-20 PROCEDURE — 86364 TISS TRNSGLTMNASE EA IG CLAS: CPT

## 2022-12-20 PROCEDURE — 83036 HEMOGLOBIN GLYCOSYLATED A1C: CPT

## 2022-12-20 PROCEDURE — 82746 ASSAY OF FOLIC ACID SERUM: CPT

## 2022-12-20 RX ORDER — CYCLOBENZAPRINE HCL 5 MG
TABLET ORAL
COMMUNITY
Start: 2022-10-30

## 2022-12-20 RX ORDER — AMOXICILLIN 500 MG/1
CAPSULE ORAL
COMMUNITY
Start: 2022-12-09

## 2022-12-20 NOTE — PROGRESS NOTES
Subjective   Patient ID: Heavenly Michel is a 57 y.o. female     Chief Complaint   Patient presents with   • White matter disease        History of Present Illness    57 y.o. female referred by Dr Camron Johnson for abnormal MRI.    HA over left eye.  Lasts for three days.  Quality is sharp pain.  Moderate to severe intensity.      Provoked by dehydration.     OTC meds sometimes helpful.      MRI Brain, 9/12/22, moderate T2 white matter changes.     Dr Gagnon started ASA    Chadsvasc 1     L LE numbness and weakness.     B UE have N/T at night    OS blurry vision.      Middle of back has a numb spot for a few years.    Fatigue is severe.      Denies heat intolerance     Reviewed medical records:    PAF tx with amio, digoxin, cardizem    Results for orders placed during the hospital encounter of 11/09/18    Adult Stress Echo W/ Cont or Stress Agent if Necessary Per Protocol    Interpretation Summary  · Left ventricular systolic function is normal. Estimated EF = 55%.  · The cardiac valves are anatomically and functionally normal.  · Normal stress echo with no echocardiographic evidence for myocardial ischemia.    A1C 5.7, TSH 0.72    Past Medical History:   Diagnosis Date   • Chicken pox    • Menopause    • Thyroid disease      Family History   Problem Relation Age of Onset   • Breast cancer Maternal Aunt         UNKNOWN   • Ovarian cancer Paternal Aunt         UNKNOWN   • Breast cancer Maternal Aunt         60'S   • Breast cancer Cousin 45   • COPD Mother    • Cancer Father    • Cancer Sister    • Hypertension Brother    • Stroke Other    • Hypertension Other    • Sudden death Other      Social History     Socioeconomic History   • Marital status: Single   Tobacco Use   • Smoking status: Never   • Smokeless tobacco: Never   Substance and Sexual Activity   • Alcohol use: Yes     Comment: occas   • Drug use: No   • Sexual activity: Defer       Review of Systems   Constitutional: Negative for activity change, fatigue and  "unexpected weight change.   HENT: Negative for tinnitus and trouble swallowing.    Eyes: Negative for photophobia and visual disturbance.   Respiratory: Negative for apnea, cough and choking.    Cardiovascular: Positive for palpitations. Negative for leg swelling.   Gastrointestinal: Negative for nausea and vomiting.   Endocrine: Negative for cold intolerance and heat intolerance.   Genitourinary: Negative for difficulty urinating, frequency, menstrual problem and urgency.   Musculoskeletal: Negative for back pain, gait problem, myalgias and neck pain.   Skin: Negative for color change and rash.   Allergic/Immunologic: Negative for immunocompromised state.   Neurological: Negative for dizziness, tremors, seizures, syncope, facial asymmetry, speech difficulty, weakness, light-headedness, numbness and headaches.   Hematological: Negative for adenopathy. Does not bruise/bleed easily.   Psychiatric/Behavioral: Negative for behavioral problems, confusion, decreased concentration, hallucinations and sleep disturbance.       Objective     Vitals:    12/20/22 1350   BP: 126/82   BP Location: Right arm   Patient Position: Sitting   Cuff Size: Adult   Pulse: 77   Resp: 16   Temp: 98.2 °F (36.8 °C)   TempSrc: Infrared   SpO2: 99%   Weight: 71.1 kg (156 lb 12.8 oz)   Height: 170.2 cm (67.01\")       Neurologic Exam     Mental Status   Oriented to person, place, and time.   Speech: speech is normal   Level of consciousness: alert  Knowledge: good and consistent with education.   Normal comprehension.     Cranial Nerves   Cranial nerves II through XII intact.     CN II   Visual fields full to confrontation.   Visual acuity: normal  Right visual field deficit: none  Left visual field deficit: none     CN III, IV, VI   Pupils are equal, round, and reactive to light.  Extraocular motions are normal.   Nystagmus: none   Diplopia: none  Ophthalmoparesis: none  Upgaze: normal  Downgaze: normal  Conjugate gaze: present    CN V   Facial " sensation intact.   Right corneal reflex: normal  Left corneal reflex: normal    CN VII   Right facial weakness: none  Left facial weakness: none    CN VIII   Hearing: intact    CN IX, X   Palate: symmetric  Right gag reflex: normal  Left gag reflex: normal    CN XI   Right sternocleidomastoid strength: normal  Left sternocleidomastoid strength: normal    CN XII   Tongue: not atrophic  Fasciculations: absent  Tongue deviation: none    Motor Exam   Muscle bulk: normal  Overall muscle tone: normal    Strength   Strength 5/5 throughout.     Sensory Exam   Light touch normal.     Gait, Coordination, and Reflexes     Gait  Gait: normal    Tremor   Resting tremor: absent  Intention tremor: absent  Action tremor: absent    Reflexes   Reflexes 2+ except as noted.       Physical Exam  Eyes:      Extraocular Movements: EOM normal.      Pupils: Pupils are equal, round, and reactive to light.   Neurological:      Mental Status: She is oriented to person, place, and time.      Cranial Nerves: Cranial nerves 2-12 are intact.      Motor: Motor strength is normal.      Gait: Gait is intact.   Psychiatric:         Speech: Speech normal.         Hospital Outpatient Visit on 11/09/2018   Component Date Value Ref Range Status   • BH CV STRESS PROTOCOL 1 11/09/2018 [place]   Final   • Stage 1 11/09/2018 1   Final   • Duration Min Stage 1 11/09/2018 3   Final   • Duration Sec Stage 1 11/09/2018 0   Final   • Grade Stage 1 11/09/2018 10   Final   • Speed Stage 1 11/09/2018 1.7   Final   • BH CV STRESS METS STAGE 1 11/09/2018 5   Final   • Baseline HR 11/09/2018 86  bpm Final   • Baseline BP 11/09/2018 144/74  mmHg Final   • BP Stage 1 11/09/2018 156/78   Final   • HR Stage 1 11/09/2018 130   Final   • Stage 2 11/09/2018 2   Final   • HR Stage 2 11/09/2018 157   Final   • BP Stage 2 11/09/2018 160/80   Final   • Duration Min Stage 2 11/09/2018 1   Final   • Duration Sec Stage 2 11/09/2018 58   Final   • Grade Stage 2 11/09/2018 12   Final    • Speed Stage 2 11/09/2018 2.5   Final   • BH CV STRESS METS STAGE 2 11/09/2018 7.5   Final   • Peak HR 11/09/2018 157  bpm Final   • Peak BP 11/09/2018 162/64  mmHg Final   • Recovery HR 11/09/2018 98  bpm Final   • Recovery BP 11/09/2018 124/70  mmHg Final   • Exercise duration (min) 11/09/2018 4  min Final   • Exercise duration (sec) 11/09/2018 58  sec Final   • Estimated workload 11/09/2018 7.0  METS Final   • BSA 11/09/2018 1.8  m^2 Final   • Ao pk nuris 11/09/2018 169.4  cm/sec Final   • Ao max PG 11/09/2018 12.0  mmHg Final   • Ao max PG (full) 11/09/2018 4.9  mmHg Final   • Ao V2 mean 11/09/2018 105.1  cm/sec Final   • Ao mean PG 11/09/2018 5.3  mmHg Final   • Ao mean PG (full) 11/09/2018 1.9  mmHg Final   • Ao V2 VTI 11/09/2018 33.0  cm Final   • LV V1 max PG 11/09/2018 7.1  mmHg Final   • LV V1 mean PG 11/09/2018 3.4  mmHg Final   • LV V1 max 11/09/2018 133.3  cm/sec Final   • LV V1 mean 11/09/2018 84.2  cm/sec Final   • LV V1 VTI 11/09/2018 26.9  cm Final   • TR max nuris 11/09/2018 210.7  cm/sec Final   • TR max PG 11/09/2018 18.0  mmHg Final   • RVSP(TR) 11/09/2018 26.0  mmHg Final   • RAP systole 11/09/2018 8.0  mmHg Final   • BH CV ECHO [place] - BZI_BMI 11/09/2018 24.3  kilograms/m^2 Final   • [facility] [place] [facility] 11/09/2018 1.8  m^2 Final   • BH CV ECHO [place] - BZI_METRIC_WEIGHT 11/09/2018 70.3  kg Final   • BH CV ECHO [place] - BZI_METRIC_HEIGHT 11/09/2018 170.2  cm Final   • Percent Target HR 11/09/2018 111  % Final   • Percent Max Pred HR 11/09/2018 94.58  % Final   • Target HR (85%) 11/09/2018 141  bpm Final   • Max. Pred. HR (100%) 11/09/2018 166  bpm Final   • BH CV VAS BP LEFT ARM 11/09/2018 144/74  mmHg Final   • Echo EF Estimated 11/09/2018 55  % Final         Assessment & Plan     Problem List Items Addressed This Visit        Neuro    Demyelinating disease (HCC) (Chronic)    Current Assessment & Plan     Check labs, MRI Cervical, LP          Relevant Orders    MARIA C by IFA, Reflex  9-biomarkers profile    Angiotensin Converting Enzyme    Hemoglobin A1c    Lupus Anticoagulant Panel    Neuromyelitis Optica (NMO) Auto Antibody, IgG    Rheumatoid Factor    Sedimentation Rate    Anti-Myelin Oligodendrocyte Glycoprotein (MOG), Serum    CBC & Differential    Celiac Disease Panel    Cardiolipin Antibody    Factor 5 Leiden    Factor II, DNA Analysis    Vitamin B12 & Folate    MRI Cervical Spine With & Without Contrast    IR Lumbar Puncture Diagnosis    Periodic headache syndrome, not intractable - Primary (Chronic)    Current Assessment & Plan     Headaches are worsening.  Medication changes per orders.     Nurtec              Relevant Medications    cyclobenzaprine (FLEXERIL) 5 MG tablet          No follow-ups on file.

## 2022-12-21 LAB
F5 GENE MUT ANL BLD/T: NORMAL
FACTOR II, DNA ANALYSIS: NORMAL
FOLATE SERPL-MCNC: 12.2 NG/ML (ref 4.78–24.2)
HBA1C MFR BLD: 5.4 % (ref 4.8–5.6)
VIT B12 BLD-MCNC: 409 PG/ML (ref 211–946)

## 2022-12-22 LAB
ACE SERPL-CCNC: 40 U/L (ref 14–82)
ENDOMYSIUM IGA SER QL: NEGATIVE
IGA SERPL-MCNC: 126 MG/DL (ref 87–352)
TTG IGA SER-ACNC: <2 U/ML (ref 0–3)

## 2022-12-26 LAB
ANA SER QL IF: NEGATIVE
LABORATORY COMMENT REPORT: NORMAL

## 2022-12-28 LAB — MOG AB SER QL CBA IFA: NEGATIVE

## 2022-12-29 LAB
APTT HEX PL PPP: 0 SEC
APTT IMM NP PPP: NORMAL SEC
APTT PPP 1:1 SALINE: NORMAL SEC
APTT PPP: 26.2 SEC
B2 GLYCOPROT1 IGA SER-ACNC: <10 SAU
B2 GLYCOPROT1 IGG SER-ACNC: <10 SGU
B2 GLYCOPROT1 IGM SER-ACNC: <10 SMU
CARDIOLIPIN IGG SER IA-ACNC: <10 GPL
CARDIOLIPIN IGM SER IA-ACNC: <10 MPL
CONFIRM APTT: 0 SEC
CONFIRM DRVVT: NORMAL SEC
DRVVT SCREEN TO CONFIRM RATIO: NORMAL RATIO
INR PPP: 1 RATIO
LABORATORY COMMENT REPORT: NORMAL
PROTHROMBIN TIME: 10.2 SEC
SCREEN DRVVT: 34.8 SEC
THROMBIN TIME: 15.8 SEC

## 2023-01-01 LAB — AQP4 H2O CHANNEL IGG SERPL QL: NEGATIVE

## 2023-01-09 ENCOUNTER — HOSPITAL ENCOUNTER (OUTPATIENT)
Dept: GENERAL RADIOLOGY | Facility: HOSPITAL | Age: 58
Discharge: HOME | End: 2023-01-09
Admitting: PSYCHIATRY & NEUROLOGY
Payer: COMMERCIAL

## 2023-01-09 VITALS
HEIGHT: 68 IN | SYSTOLIC BLOOD PRESSURE: 123 MMHG | BODY MASS INDEX: 24.37 KG/M2 | OXYGEN SATURATION: 99 % | DIASTOLIC BLOOD PRESSURE: 74 MMHG | WEIGHT: 160.8 LBS | TEMPERATURE: 97.7 F | HEART RATE: 80 BPM | RESPIRATION RATE: 18 BRPM

## 2023-01-09 DIAGNOSIS — G37.9 DEMYELINATING DISEASE: ICD-10-CM

## 2023-01-09 LAB
APPEARANCE CSF: CLEAR
APPEARANCE CSF: CLEAR
COLOR CSF: COLORLESS
COLOR CSF: COLORLESS
COLOR SPUN CSF: COLORLESS
COLOR SPUN CSF: COLORLESS
CRYPTOC AG CSF QL LA: NEGATIVE
GLUCOSE CSF-MCNC: 56 MG/DL (ref 40–70)
PROT CSF-MCNC: 48.9 MG/DL (ref 15–45)
RBC # CSF MANUAL: 0 /MM3 (ref 0–5)
RBC # CSF MANUAL: 2 /MM3 (ref 0–5)
SPECIMEN VOL CSF: 11.5 ML
SPECIMEN VOL CSF: 11.5 ML
TUBE # CSF: 1
TUBE # CSF: 4
WBC # CSF MANUAL: 0 /MM3 (ref 0–5)
WBC # CSF MANUAL: 1 /MM3 (ref 0–5)

## 2023-01-09 PROCEDURE — 82040 ASSAY OF SERUM ALBUMIN: CPT | Performed by: PSYCHIATRY & NEUROLOGY

## 2023-01-09 PROCEDURE — 87327 CRYPTOCOCCUS NEOFORM AG IA: CPT | Performed by: PSYCHIATRY & NEUROLOGY

## 2023-01-09 PROCEDURE — 82164 ANGIOTENSIN I ENZYME TEST: CPT | Performed by: PSYCHIATRY & NEUROLOGY

## 2023-01-09 PROCEDURE — 82042 OTHER SOURCE ALBUMIN QUAN EA: CPT | Performed by: PSYCHIATRY & NEUROLOGY

## 2023-01-09 PROCEDURE — 89050 BODY FLUID CELL COUNT: CPT | Performed by: PSYCHIATRY & NEUROLOGY

## 2023-01-09 PROCEDURE — 87015 SPECIMEN INFECT AGNT CONCNTJ: CPT | Performed by: PSYCHIATRY & NEUROLOGY

## 2023-01-09 PROCEDURE — 82784 ASSAY IGA/IGD/IGG/IGM EACH: CPT | Performed by: PSYCHIATRY & NEUROLOGY

## 2023-01-09 PROCEDURE — 84157 ASSAY OF PROTEIN OTHER: CPT | Performed by: PSYCHIATRY & NEUROLOGY

## 2023-01-09 PROCEDURE — 82945 GLUCOSE OTHER FLUID: CPT | Performed by: PSYCHIATRY & NEUROLOGY

## 2023-01-09 PROCEDURE — 83916 OLIGOCLONAL BANDS: CPT | Performed by: PSYCHIATRY & NEUROLOGY

## 2023-01-09 PROCEDURE — 0 LIDOCAINE 1 % SOLUTION: Performed by: PSYCHIATRY & NEUROLOGY

## 2023-01-09 PROCEDURE — 86592 SYPHILIS TEST NON-TREP QUAL: CPT | Performed by: PSYCHIATRY & NEUROLOGY

## 2023-01-09 PROCEDURE — 87070 CULTURE OTHR SPECIMN AEROBIC: CPT | Performed by: PSYCHIATRY & NEUROLOGY

## 2023-01-09 PROCEDURE — 87205 SMEAR GRAM STAIN: CPT | Performed by: PSYCHIATRY & NEUROLOGY

## 2023-01-09 RX ORDER — LIDOCAINE HYDROCHLORIDE 10 MG/ML
5 INJECTION, SOLUTION INFILTRATION; PERINEURAL ONCE
Status: COMPLETED | OUTPATIENT
Start: 2023-01-09 | End: 2023-01-09

## 2023-01-09 RX ADMIN — LIDOCAINE HYDROCHLORIDE 5 ML: 10 INJECTION, SOLUTION INFILTRATION; PERINEURAL at 11:07

## 2023-01-09 NOTE — POST-PROCEDURE NOTE
Radiology Procedure    Pre-procedure: procedure, risks discussed with patient. Patient indicated understanding and consented to procedure.   Procedure Performed: lumbar puncture     IV Sedation and/or Anesthesia:  No    Complications: none    Preliminary Findings: opening pressure 16cm H2O, closing pressure 11cm H2O    Specimen Removed: 10cc clear, colorless CSF    Estimated Blood Loss:  0ml    Post-Procedure Diagnosis: pending    Post-Procedure Plan: encourage fluids, bed rest x 2 hours    Standard Discharge Instructions Given:yes     DEBBIE Cedillo  01/09/23  10:57 EST

## 2023-01-10 ENCOUNTER — TELEPHONE (OUTPATIENT)
Dept: INFUSION THERAPY | Facility: HOSPITAL | Age: 58
End: 2023-01-10
Payer: COMMERCIAL

## 2023-01-11 LAB
ACE CSF-CCNC: <1.5 U/L (ref 0–3.1)
ALB CSF/SERPL: 7 {RATIO} (ref 0–8)
ALBUMIN CSF-MCNC: 32 MG/DL (ref 8–37)
ALBUMIN SERPL-MCNC: 4.3 G/DL (ref 3.8–4.9)
IGG CSF-MCNC: 3.2 MG/DL (ref 0–6.7)
IGG SERPL-MCNC: 961 MG/DL (ref 586–1602)
IGG SYNTH RATE SER+CSF CALC-MRATE: -3.4 MG/DAY
IGG/ALB CLEAR SER+CSF-RTO: 0.4 (ref 0–0.7)
IGG/ALB CSF: 0.1 {RATIO} (ref 0–0.25)
OLIGOCLONAL BANDS.IT SER+CSF QL: NORMAL
REAGIN AB CSF QL: NON REACTIVE

## 2023-01-12 LAB
BACTERIA SPEC AEROBE CULT: NORMAL
GRAM STN SPEC: NORMAL

## 2023-01-26 ENCOUNTER — TELEPHONE (OUTPATIENT)
Dept: NEUROLOGY | Facility: CLINIC | Age: 58
End: 2023-01-26
Payer: COMMERCIAL

## 2023-01-26 DIAGNOSIS — G43.C0 PERIODIC HEADACHE SYNDROME, NOT INTRACTABLE: Primary | ICD-10-CM

## 2023-01-26 RX ORDER — ALPRAZOLAM 1 MG/1
TABLET ORAL
Qty: 2 TABLET | Refills: 0 | Status: SHIPPED | OUTPATIENT
Start: 2023-01-26

## 2023-01-26 NOTE — TELEPHONE ENCOUNTER
Provider: MADINA  Caller: PATIENT  Relationship to Patient: SELF  Pharmacy: [facility]  Phone Number: [phone]  Reason for Call: PATIENT IS SCHEDULED FOR AN MRI ON 2/3/23 AND IS CLAUSTROPHOBIC.  PATIENT WOULD LIKE SOMEONE TO CALL HER BACK TO DISCUSS OPTIONS, SUCH AS MEDICATION, OR SOMETHING ELSE TO HELP HER RELAX DURING THE STUDY.     PLEASE REVIEW AND ADVISE     THANK YOU

## 2023-01-26 NOTE — TELEPHONE ENCOUNTER
Notified patient Dr. Hull called in xanax to help her relax during MRI. She asked if it would do anything else besides help her relax. Advised her to have someone drive her and come with her for the appointment it could make her drowsy. Patient stated she is not used to taking medication like that so I advised her to take 1/2 a tablet 30 minutes prior and if once she is at the facility to take the other 1/2 if it is not working. She verbalized understanding.

## 2023-02-14 ENCOUNTER — TRANSCRIBE ORDERS (OUTPATIENT)
Dept: ADMINISTRATIVE | Facility: HOSPITAL | Age: 58
End: 2023-02-14
Payer: COMMERCIAL

## 2023-02-14 DIAGNOSIS — Z12.31 VISIT FOR SCREENING MAMMOGRAM: Primary | ICD-10-CM

## 2023-02-22 ENCOUNTER — TELEPHONE (OUTPATIENT)
Dept: NEUROLOGY | Facility: CLINIC | Age: 58
End: 2023-02-22
Payer: COMMERCIAL

## 2023-02-22 NOTE — TELEPHONE ENCOUNTER
Provider:MADINA  Caller: DIANELYS  Relationship to Patient: SELF  Pharmacy: N/A  Phone Number: [phone]  Reason for Call:PT STATE SHE HAD BLOOD WORK DONE /20/2022 AND IS SHOWED THAT PT HAS HIGH PROTEIN.PT WAS WANTING TO KNOW IF SHE CAN HAVE HER PROTEIN RECHECKED TO SEE WHAT IS GOING ON?    PLEAS REIVEWA NAD ADVISE.  THANK YOU

## 2023-02-24 ENCOUNTER — HOSPITAL ENCOUNTER (OUTPATIENT)
Dept: MAMMOGRAPHY | Facility: HOSPITAL | Age: 58
Discharge: HOME | End: 2023-02-24
Admitting: NURSE PRACTITIONER
Payer: COMMERCIAL

## 2023-02-24 DIAGNOSIS — Z12.31 VISIT FOR SCREENING MAMMOGRAM: ICD-10-CM

## 2023-02-24 PROCEDURE — 77063 BREAST TOMOSYNTHESIS BI: CPT

## 2023-02-24 PROCEDURE — 77067 SCR MAMMO BI INCL CAD: CPT | Performed by: RADIOLOGY

## 2023-02-24 PROCEDURE — 77063 BREAST TOMOSYNTHESIS BI: CPT | Performed by: RADIOLOGY

## 2023-02-24 PROCEDURE — 77067 SCR MAMMO BI INCL CAD: CPT

## 2023-03-13 ENCOUNTER — HOSPITAL ENCOUNTER (OUTPATIENT)
Dept: MRI IMAGING | Facility: HOSPITAL | Age: 58
Discharge: HOME | End: 2023-03-13
Admitting: PSYCHIATRY & NEUROLOGY
Payer: COMMERCIAL

## 2023-03-13 DIAGNOSIS — G37.9 DEMYELINATING DISEASE: ICD-10-CM

## 2023-03-13 PROCEDURE — 0 GADOBENATE DIMEGLUMINE 529 MG/ML SOLUTION: Performed by: PSYCHIATRY & NEUROLOGY

## 2023-03-13 PROCEDURE — A9577 INJ MULTIHANCE: HCPCS | Performed by: PSYCHIATRY & NEUROLOGY

## 2023-03-13 PROCEDURE — 72156 MRI NECK SPINE W/O & W/DYE: CPT

## 2023-03-13 RX ADMIN — GADOBENATE DIMEGLUMINE 14 ML: 529 INJECTION, SOLUTION INTRAVENOUS at 15:15

## 2023-03-14 ENCOUNTER — TELEPHONE (OUTPATIENT)
Dept: NEUROLOGY | Facility: CLINIC | Age: 58
End: 2023-03-14
Payer: COMMERCIAL

## 2023-03-14 NOTE — TELEPHONE ENCOUNTER
----- Message from Alexander Hull MD sent at 3/14/2023  7:48 AM EDT -----  Noitfy pt MRI cervical and LP are normal      ----- Message -----  From: [facility] [place] [facility]  Sent: 3/14/2023   6:08 AM EDT  To: Alexander Hull MD

## 2023-03-14 NOTE — TELEPHONE ENCOUNTER
Called patient and left a VM giving her Dr. Hull's message about MRI results. Let her know Dr. Hull said MRI and LP were normal. If further questions to please give our office a call.

## 2023-05-01 ENCOUNTER — OFFICE VISIT (OUTPATIENT)
Dept: NEUROLOGY | Facility: CLINIC | Age: 58
End: 2023-05-01
Payer: COMMERCIAL

## 2023-05-01 VITALS
WEIGHT: 157.4 LBS | HEART RATE: 99 BPM | DIASTOLIC BLOOD PRESSURE: 86 MMHG | SYSTOLIC BLOOD PRESSURE: 148 MMHG | HEIGHT: 68 IN | BODY MASS INDEX: 23.86 KG/M2 | OXYGEN SATURATION: 98 %

## 2023-05-01 DIAGNOSIS — G43.C0 PERIODIC HEADACHE SYNDROME, NOT INTRACTABLE: Primary | Chronic | ICD-10-CM

## 2023-05-01 DIAGNOSIS — G37.9 DEMYELINATING DISEASE: Chronic | ICD-10-CM

## 2023-05-01 PROCEDURE — 99214 OFFICE O/P EST MOD 30 MIN: CPT | Performed by: PSYCHIATRY & NEUROLOGY

## 2023-05-01 NOTE — PROGRESS NOTES
"Chief Complaint    Headache    Subjective        Heavenly Michel presents to [facility]     History of Present Illness    58 y.o. female returns in follow up.  Last visit on 12/20/22 ordered MRI Cervical, LP, labs.     MRI Cervical, my review of films, 3/13/23 mild DDD    MS profile normal   CSF protein 48.9   NMO/MOG negative     HA frequency is 1 - 2 to a month.  Located over left eye.  Lasts for three days.  Quality is sharp pain.  Moderate to severe intensity.       Ubrevly of no benefit.     Provoked by dehydration.      OTC meds sometimes helpful.       MRI Brain, 9/12/22, moderate T2 white matter changes.      Dr Gagnon started ASA     Chadsvasc 1      L LE numbness and weakness.      B UE have N/T at night     OS blurry vision.       Middle of back has a numb spot for a few years.     Fatigue is severe.       Denies heat intolerance      Reviewed medical records:     PAF tx with amio, digoxin, cardizem     Results for orders placed during the hospital encounter of 11/09/18     Adult Stress Echo W/ Cont or Stress Agent if Necessary Per Protocol     Interpretation Summary  · Left ventricular systolic function is normal. Estimated EF = 55%.  · The cardiac valves are anatomically and functionally normal.  · Normal stress echo with no echocardiographic evidence for myocardial ischemia.     A1C 5.7, TSH 0.72  Objective   Vital Signs:  /86   Pulse 99   Ht 172.7 cm (68\")   Wt 71.4 kg (157 lb 6.4 oz)   SpO2 98%   BMI 23.93 kg/m²   Estimated body mass index is 23.93 kg/m² as calculated from the following:    Height as of this encounter: 172.7 cm (68\").    Weight as of this encounter: 71.4 kg (157 lb 6.4 oz).       BMI is within normal parameters. No other follow-up for BMI required.      Physical Exam  Eyes:      Extraocular Movements: EOM normal.      Pupils: Pupils are equal, round, and reactive to light.   Neurological:      Mental Status: She is oriented to person, " place, and time.      Cranial Nerves: Cranial nerves 2-12 are intact.      Motor: Motor strength is normal.      Gait: Gait is intact.   Psychiatric:         Speech: Speech normal.          Neurologic Exam     Mental Status   Oriented to person, place, and time.   Speech: speech is normal   Level of consciousness: alert  Knowledge: good and consistent with education.   Normal comprehension.     Cranial Nerves   Cranial nerves II through XII intact.     CN II   Visual fields full to confrontation.   Visual acuity: normal  Right visual field deficit: none  Left visual field deficit: none     CN III, IV, VI   Pupils are equal, round, and reactive to light.  Extraocular motions are normal.   Nystagmus: none   Diplopia: none  Ophthalmoparesis: none  Upgaze: normal  Downgaze: normal  Conjugate gaze: present    CN V   Facial sensation intact.   Right corneal reflex: normal  Left corneal reflex: normal    CN VII   Right facial weakness: none  Left facial weakness: none    CN VIII   Hearing: intact    CN IX, X   Palate: symmetric  Right gag reflex: normal  Left gag reflex: normal    CN XI   Right sternocleidomastoid strength: normal  Left sternocleidomastoid strength: normal    CN XII   Tongue: not atrophic  Fasciculations: absent  Tongue deviation: none    Motor Exam   Muscle bulk: normal  Overall muscle tone: normal    Strength   Strength 5/5 throughout.     Sensory Exam   Light touch normal.     Gait, Coordination, and Reflexes     Gait  Gait: normal    Tremor   Resting tremor: absent  Intention tremor: absent  Action tremor: absent    Reflexes   Reflexes 2+ except as noted.      Result Review :  The following data was reviewed by: Alexander Hull MD on 05/01/2023:  Common labs        12/20/2022    14:40 1/9/2023    11:08   Common Labs   Albumin  4.3     WBC 5.90      Hemoglobin 13.3      Hematocrit 40.5      Platelets 259      Hemoglobin A1C 5.40        Data reviewed: Radiologic studies MRI B/C             Assessment  and Plan   Diagnoses and all orders for this visit:    1. Periodic headache syndrome, not intractable (Primary)  Assessment & Plan:  Headaches are unchanged.  Medication changes per orders.     Samples of Nurtec          2. Demyelinating disease           Follow Up   No follow-ups on file.  Patient was given instructions and counseling regarding her condition or for health maintenance advice. Please see specific information pulled into the AVS if appropriate.

## 2024-02-21 ENCOUNTER — TRANSCRIBE ORDERS (OUTPATIENT)
Dept: ADMINISTRATIVE | Facility: HOSPITAL | Age: 59
End: 2024-02-21
Payer: COMMERCIAL

## 2024-02-21 DIAGNOSIS — Z12.31 VISIT FOR SCREENING MAMMOGRAM: Primary | ICD-10-CM

## 2024-03-29 PROCEDURE — 93306 TTE W/DOPPLER COMPLETE: CPT | Performed by: INTERNAL MEDICINE

## 2024-04-05 ENCOUNTER — OUTSIDE FACILITY SERVICE (OUTPATIENT)
Dept: CARDIOLOGY | Facility: CLINIC | Age: 59
End: 2024-04-05
Payer: MEDICAID

## 2024-05-10 ENCOUNTER — HOSPITAL ENCOUNTER (OUTPATIENT)
Dept: MAMMOGRAPHY | Facility: HOSPITAL | Age: 59
Discharge: HOME | End: 2024-05-10
Admitting: FAMILY MEDICINE
Payer: MEDICAID

## 2024-05-10 DIAGNOSIS — Z12.31 VISIT FOR SCREENING MAMMOGRAM: ICD-10-CM

## 2024-05-10 PROCEDURE — 77063 BREAST TOMOSYNTHESIS BI: CPT

## 2024-05-10 PROCEDURE — 77067 SCR MAMMO BI INCL CAD: CPT
